# Patient Record
Sex: FEMALE | Race: WHITE | NOT HISPANIC OR LATINO | Employment: UNEMPLOYED | URBAN - METROPOLITAN AREA
[De-identification: names, ages, dates, MRNs, and addresses within clinical notes are randomized per-mention and may not be internally consistent; named-entity substitution may affect disease eponyms.]

---

## 2017-06-13 ENCOUNTER — OFFICE VISIT (OUTPATIENT)
Dept: OPHTHALMOLOGY | Facility: CLINIC | Age: 52
End: 2017-06-13
Payer: COMMERCIAL

## 2017-06-13 DIAGNOSIS — Z83.518 FAMILY HISTORY OF MACULAR DEGENERATION: ICD-10-CM

## 2017-06-13 DIAGNOSIS — H04.123 DRY EYE SYNDROME, BILATERAL: Primary | ICD-10-CM

## 2017-06-13 PROCEDURE — 92014 COMPRE OPH EXAM EST PT 1/>: CPT | Mod: S$GLB,,, | Performed by: OPHTHALMOLOGY

## 2017-06-13 PROCEDURE — 99999 PR PBB SHADOW E&M-EST. PATIENT-LVL II: CPT | Mod: PBBFAC,,, | Performed by: OPHTHALMOLOGY

## 2017-06-13 NOTE — PROGRESS NOTES
HPI     Dry Eye    Additional comments: 2 year RTC, Systane prn OU           Comments   Pt states she's been doing well since her last visit. Had to bump up to   +1.75 readers, was using +1.50. Can still see well at a distance. Only   using Systane as needed, not everyday. Wasn't aware of the [place] 3 Fish   Oil. Will give that a try. No ocular pain or irritation.    PCP: Dr. Lou    Dry Eyes  Presbyopia  Fm Hx Macular Degeneration (Mother)  Systane OU prn       Last edited by Will Valera on 6/13/2017  8:59 AM. (History)            Assessment /Plan     For exam results, see Encounter Report.      ICD-10-CM ICD-9-CM    1. Dry eye syndrome, bilateral H04.123 375.15 Continue systane and add omega 3 fish oil.    2. Family history of macular degeneration Z83.518 V19.19        Return to clinic 1 year.

## 2018-01-05 ENCOUNTER — OFFICE VISIT (OUTPATIENT)
Dept: OPHTHALMOLOGY | Facility: CLINIC | Age: 53
End: 2018-01-05
Payer: COMMERCIAL

## 2018-01-05 DIAGNOSIS — H10.13 ALLERGIC CONJUNCTIVITIS OF BOTH EYES: Primary | ICD-10-CM

## 2018-01-05 PROCEDURE — 99999 PR PBB SHADOW E&M-EST. PATIENT-LVL II: CPT | Mod: PBBFAC,,, | Performed by: OPHTHALMOLOGY

## 2018-01-05 PROCEDURE — 92012 INTRM OPH EXAM EST PATIENT: CPT | Mod: S$GLB,,, | Performed by: OPHTHALMOLOGY

## 2018-01-05 RX ORDER — FLUTICASONE PROPIONATE 50 MCG
SPRAY, SUSPENSION (ML) NASAL
Refills: 0 | COMMUNITY
Start: 2017-12-11 | End: 2023-08-29

## 2018-01-05 NOTE — PROGRESS NOTES
===============================  01/05/2018   Haley Younger,   52 y.o. female   Last visit [facility]: :Visit date not found   Last visit eye dept. Visit date not found  [facility]:  Uncorrected distance visual acuity was 20/20 in the right eye and 20/20 in the left eye.   Not recorded         Not recorded         Not recorded        Chief Complaint   Patient presents with    Dry Eye SYNDROME     NEW PT TO DR. VIERA/  [facility] AND RED EYES        HPI     Dry Eye SYNDROME    Additional comments: NEW PT TO DR. VIERA/  [facility] AND RED EYES           Comments   PCP: Dr. Lou    Dry Eyes  Presbyopia  Fm Hx Macular Degeneration (Mother)  Systane OU prn       Last edited by Maryam Albarran on 1/5/2018 11:26 AM. (History)          ________________  1/5/2018  Problem List Items Addressed This Visit     None        Since Monday blurred va red eye  Lid everfsion Ok  No stain 'allegrci    Then zaditor inb    k cl lens cl  T  18  18  inb zaditor         ===========================

## 2018-01-30 ENCOUNTER — OFFICE VISIT (OUTPATIENT)
Dept: OPHTHALMOLOGY | Facility: CLINIC | Age: 53
End: 2018-01-30
Payer: COMMERCIAL

## 2018-01-30 DIAGNOSIS — H52.03 HYPERMETROPIA OF BOTH EYES: Primary | ICD-10-CM

## 2018-01-30 PROCEDURE — 92014 COMPRE OPH EXAM EST PT 1/>: CPT | Mod: S$GLB,,, | Performed by: OPHTHALMOLOGY

## 2018-01-30 PROCEDURE — 99999 PR PBB SHADOW E&M-EST. PATIENT-LVL II: CPT | Mod: PBBFAC,,, | Performed by: OPHTHALMOLOGY

## 2018-01-30 NOTE — PROGRESS NOTES
===============================  01/30/2018   Haley Younger,   52 y.o. female   Last visit [facility]: :1/5/2018   Last visit eye dept. 1/5/2018  [facility]:  Uncorrected distance visual acuity was 20/20 in the right eye and 20/20 in the left eye.  Tonometry     Tonometry (Applanation, 8:31 AM)       Right Left    Pressure 18 16               Not recorded        Manifest Refraction     Manifest Refraction       Sphere Cylinder Dist VA    Right +1.00 Sphere 20/20    Left +1.00 Sphere 20/20              Chief Complaint   Patient presents with    Concerns About Ocular Health     pt c/o episodes of hazy vision and light sensitivity, seeing a neurologist next week for head pressure        HPI     Concerns About Ocular Health    Additional comments: pt c/o episodes of hazy vision and light   sensitivity, seeing a neurologist next week for head pressure           Comments   Dry Eyes  Presbyopia  Fm Hx Macular Degeneration (Mother)  Systane OU prn       Last edited by YEMI Brown on 1/30/2018  8:00 AM. (History)          ________________  1/30/2018  Problem List Items Addressed This Visit     None      Visit Diagnoses     Hypermetropia of both eyes    -  Primary      latent hyperope  Gl rx given  If symptoms dont inprove call           ===========================

## 2018-02-13 ENCOUNTER — OFFICE VISIT (OUTPATIENT)
Dept: OPHTHALMOLOGY | Facility: CLINIC | Age: 53
End: 2018-02-13
Payer: COMMERCIAL

## 2018-02-13 DIAGNOSIS — H52.03 HYPERMETROPIA OF BOTH EYES: Primary | ICD-10-CM

## 2018-02-13 PROCEDURE — 99499 UNLISTED E&M SERVICE: CPT | Mod: S$GLB,,, | Performed by: OPHTHALMOLOGY

## 2018-02-13 PROCEDURE — 99999 PR PBB SHADOW E&M-EST. PATIENT-LVL II: CPT | Mod: PBBFAC,,, | Performed by: OPHTHALMOLOGY

## 2018-02-13 NOTE — PROGRESS NOTES
"    ===============================  02/13/2018   Haley Younger,   52 y.o. female   Last visit [facility]: :1/30/2018   Last visit eye dept. 1/30/2018  [facility]:  Corrected distance visual acuity was 20/20 in the right eye and 20/25 in the left eye.   Not recorded        Wearing Rx     Wearing Rx       Sphere Add    Right +1.25 +2.00    Left +1.25 +2.00    Type:  PAL               Not recorded        Chief Complaint   Patient presents with    EPRX     everything is blurry at a distance with her new glasses        HPI     EPRX    Additional comments: everything is blurry at a distance with her new   glasses           Comments   PCP: Dr. Lou    Dry Eyes  Presbyopia  Fm Hx Macular Degeneration (Mother)  Systane OU prn       Last edited by Maryam Albarran on 2/13/2018 10:18 AM. (History)          ________________  2/13/2018  Problem List Items Addressed This Visit     None      Visit Diagnoses     Hypermetropia of both eyes    -  Primary        Pt doesn't like +125 at distance  rec trying +50 "wear them and see if you get used to them, it may take several days"  rtc 10 days         ===========================    "

## 2019-09-30 ENCOUNTER — TELEPHONE (OUTPATIENT)
Dept: OPHTHALMOLOGY | Facility: CLINIC | Age: 54
End: 2019-09-30

## 2020-01-03 ENCOUNTER — OFFICE VISIT (OUTPATIENT)
Dept: OPHTHALMOLOGY | Facility: CLINIC | Age: 55
End: 2020-01-03
Payer: COMMERCIAL

## 2020-01-03 DIAGNOSIS — H52.03 HYPEROPIA OF BOTH EYES: Primary | ICD-10-CM

## 2020-01-03 DIAGNOSIS — E78.5 HYPERLIPIDEMIA, UNSPECIFIED HYPERLIPIDEMIA TYPE: ICD-10-CM

## 2020-01-03 PROBLEM — F39 MOOD DISORDER IN PARTIAL REMISSION: Status: ACTIVE | Noted: 2017-10-03

## 2020-01-03 PROBLEM — E55.9 VITAMIN D DEFICIENCY: Status: ACTIVE | Noted: 2018-09-24

## 2020-01-03 PROBLEM — H93.13 TINNITUS OF BOTH EARS: Status: ACTIVE | Noted: 2020-01-03

## 2020-01-03 PROCEDURE — 99999 PR PBB SHADOW E&M-EST. PATIENT-LVL II: ICD-10-PCS | Mod: PBBFAC,,, | Performed by: OPHTHALMOLOGY

## 2020-01-03 PROCEDURE — 99999 PR PBB SHADOW E&M-EST. PATIENT-LVL II: CPT | Mod: PBBFAC,,, | Performed by: OPHTHALMOLOGY

## 2020-01-03 PROCEDURE — 92014 PR EYE EXAM, EST PATIENT,COMPREHESV: ICD-10-PCS | Mod: S$GLB,,, | Performed by: OPHTHALMOLOGY

## 2020-01-03 PROCEDURE — 92014 COMPRE OPH EXAM EST PT 1/>: CPT | Mod: S$GLB,,, | Performed by: OPHTHALMOLOGY

## 2020-01-03 NOTE — PROGRESS NOTES
===============================  Haley RIP Younger,  1/3/2020 today   54 y.o. female   Last visit [facility]: :2/13/2018   Last visit eye dept. Visit date not found  [facility]:  Uncorrected distance visual acuity was 20/20 in the right eye and 20/20 in the left eye.  Tonometry     Tonometry (Applanation, 9:30 AM)       Right Left    Pressure 15 14               Not recorded         Not recorded         Not recorded        Chief Complaint   Patient presents with    Annual Exam     pt states she has no complaints, just wants eyes checked, doesnt need glasses for dist       ________________  1/3/2020 today  HPI     Annual Exam      Additional comments: pt states she has no complaints, just wants eyes   checked, doesnt need glasses for dist              Comments     Dry Eyes  Presbyopia  Fm Hx Macular Degeneration (Mother)  Systane OU prn          Last edited by YEMI Brown on 1/3/2020  9:25 AM. (History)      Problem List Items Addressed This Visit        Eye/Vision problems    Hyperopia of both eyes - Primary       Other    Hyperlipidemia          .normal eye exam  rtc 1 year  ===========================

## 2021-02-05 ENCOUNTER — OFFICE VISIT (OUTPATIENT)
Dept: OPHTHALMOLOGY | Facility: CLINIC | Age: 56
End: 2021-02-05
Payer: COMMERCIAL

## 2021-02-05 DIAGNOSIS — E78.5 HYPERLIPIDEMIA, UNSPECIFIED HYPERLIPIDEMIA TYPE: ICD-10-CM

## 2021-02-05 DIAGNOSIS — Z83.518 FAMILY HISTORY OF MACULAR DEGENERATION: Primary | ICD-10-CM

## 2021-02-05 DIAGNOSIS — H52.03: ICD-10-CM

## 2021-02-05 DIAGNOSIS — H04.123 DRY EYES, BILATERAL: ICD-10-CM

## 2021-02-05 PROCEDURE — 92014 PR EYE EXAM, EST PATIENT,COMPREHESV: ICD-10-PCS | Mod: S$GLB,,, | Performed by: OPHTHALMOLOGY

## 2021-02-05 PROCEDURE — 1126F PR PAIN SEVERITY QUANTIFIED, NO PAIN PRESENT: ICD-10-PCS | Mod: S$GLB,,, | Performed by: OPHTHALMOLOGY

## 2021-02-05 PROCEDURE — 99999 PR PBB SHADOW E&M-EST. PATIENT-LVL III: CPT | Mod: PBBFAC,,, | Performed by: OPHTHALMOLOGY

## 2021-02-05 PROCEDURE — 92134 POSTERIOR SEGMENT OCT RETINA (OCULAR COHERENCE TOMOGRAPHY)-BOTH EYES: ICD-10-PCS | Mod: S$GLB,,, | Performed by: OPHTHALMOLOGY

## 2021-02-05 PROCEDURE — 99999 PR PBB SHADOW E&M-EST. PATIENT-LVL III: ICD-10-PCS | Mod: PBBFAC,,, | Performed by: OPHTHALMOLOGY

## 2021-02-05 PROCEDURE — 92134 CPTRZ OPH DX IMG PST SGM RTA: CPT | Mod: S$GLB,,, | Performed by: OPHTHALMOLOGY

## 2021-02-05 PROCEDURE — 92014 COMPRE OPH EXAM EST PT 1/>: CPT | Mod: S$GLB,,, | Performed by: OPHTHALMOLOGY

## 2021-02-05 PROCEDURE — 1126F AMNT PAIN NOTED NONE PRSNT: CPT | Mod: S$GLB,,, | Performed by: OPHTHALMOLOGY

## 2021-02-05 RX ORDER — CETIRIZINE HYDROCHLORIDE 10 MG/1
CAPSULE, LIQUID FILLED ORAL
COMMUNITY

## 2021-02-05 RX ORDER — PANTOPRAZOLE SODIUM 40 MG/1
40 TABLET, DELAYED RELEASE ORAL DAILY
COMMUNITY
Start: 2021-01-20

## 2022-03-25 ENCOUNTER — OFFICE VISIT (OUTPATIENT)
Dept: OPHTHALMOLOGY | Facility: CLINIC | Age: 57
End: 2022-03-25
Payer: COMMERCIAL

## 2022-03-25 DIAGNOSIS — H52.03: ICD-10-CM

## 2022-03-25 DIAGNOSIS — Z83.518 FAMILY HISTORY OF MACULAR DEGENERATION: Primary | ICD-10-CM

## 2022-03-25 DIAGNOSIS — E78.5 HYPERLIPIDEMIA, UNSPECIFIED HYPERLIPIDEMIA TYPE: ICD-10-CM

## 2022-03-25 DIAGNOSIS — H04.123 DRY EYES, BILATERAL: ICD-10-CM

## 2022-03-25 PROCEDURE — 1160F RVW MEDS BY RX/DR IN RCRD: CPT | Mod: CPTII,S$GLB,, | Performed by: OPHTHALMOLOGY

## 2022-03-25 PROCEDURE — 1159F MED LIST DOCD IN RCRD: CPT | Mod: CPTII,S$GLB,, | Performed by: OPHTHALMOLOGY

## 2022-03-25 PROCEDURE — 92134 CPTRZ OPH DX IMG PST SGM RTA: CPT | Mod: S$GLB,,, | Performed by: OPHTHALMOLOGY

## 2022-03-25 PROCEDURE — 2023F PR DILATED RETINAL EXAM W/O EVID OF RETINOPATHY: ICD-10-PCS | Mod: CPTII,S$GLB,, | Performed by: OPHTHALMOLOGY

## 2022-03-25 PROCEDURE — 99999 PR PBB SHADOW E&M-EST. PATIENT-LVL II: ICD-10-PCS | Mod: PBBFAC,,, | Performed by: OPHTHALMOLOGY

## 2022-03-25 PROCEDURE — 99213 PR OFFICE/OUTPT VISIT, EST, LEVL III, 20-29 MIN: ICD-10-PCS | Mod: S$GLB,,, | Performed by: OPHTHALMOLOGY

## 2022-03-25 PROCEDURE — 2023F DILAT RTA XM W/O RTNOPTHY: CPT | Mod: CPTII,S$GLB,, | Performed by: OPHTHALMOLOGY

## 2022-03-25 PROCEDURE — 99999 PR PBB SHADOW E&M-EST. PATIENT-LVL II: CPT | Mod: PBBFAC,,, | Performed by: OPHTHALMOLOGY

## 2022-03-25 PROCEDURE — 92134 POSTERIOR SEGMENT OCT RETINA (OCULAR COHERENCE TOMOGRAPHY)-BOTH EYES: ICD-10-PCS | Mod: S$GLB,,, | Performed by: OPHTHALMOLOGY

## 2022-03-25 PROCEDURE — 99213 OFFICE O/P EST LOW 20 MIN: CPT | Mod: S$GLB,,, | Performed by: OPHTHALMOLOGY

## 2022-03-25 PROCEDURE — 1159F PR MEDICATION LIST DOCUMENTED IN MEDICAL RECORD: ICD-10-PCS | Mod: CPTII,S$GLB,, | Performed by: OPHTHALMOLOGY

## 2022-03-25 PROCEDURE — 1160F PR REVIEW ALL MEDS BY PRESCRIBER/CLIN PHARMACIST DOCUMENTED: ICD-10-PCS | Mod: CPTII,S$GLB,, | Performed by: OPHTHALMOLOGY

## 2022-03-29 ENCOUNTER — OFFICE VISIT (OUTPATIENT)
Dept: OPHTHALMOLOGY | Facility: CLINIC | Age: 57
End: 2022-03-29
Payer: COMMERCIAL

## 2022-03-29 DIAGNOSIS — H52.03 HYPEROPIA OF BOTH EYES: Primary | ICD-10-CM

## 2022-03-29 DIAGNOSIS — Z83.518 FAMILY HISTORY OF MACULAR DEGENERATION: ICD-10-CM

## 2022-03-29 PROCEDURE — 99999 PR PBB SHADOW E&M-EST. PATIENT-LVL II: ICD-10-PCS | Mod: PBBFAC,,, | Performed by: OPTOMETRIST

## 2022-03-29 PROCEDURE — 92002 PR EYE EXAM, NEW PATIENT,INTERMED: ICD-10-PCS | Mod: S$GLB,,, | Performed by: OPTOMETRIST

## 2022-03-29 PROCEDURE — 1159F PR MEDICATION LIST DOCUMENTED IN MEDICAL RECORD: ICD-10-PCS | Mod: CPTII,S$GLB,, | Performed by: OPTOMETRIST

## 2022-03-29 PROCEDURE — 99999 PR PBB SHADOW E&M-EST. PATIENT-LVL II: CPT | Mod: PBBFAC,,, | Performed by: OPTOMETRIST

## 2022-03-29 PROCEDURE — 92002 INTRM OPH EXAM NEW PATIENT: CPT | Mod: S$GLB,,, | Performed by: OPTOMETRIST

## 2022-03-29 PROCEDURE — 1159F MED LIST DOCD IN RCRD: CPT | Mod: CPTII,S$GLB,, | Performed by: OPTOMETRIST

## 2022-03-29 RX ORDER — ONDANSETRON 4 MG/1
4 TABLET, FILM COATED ORAL
COMMUNITY
End: 2023-08-29

## 2022-03-29 RX ORDER — MELOXICAM 15 MG
15 TABLET ORAL DAILY
COMMUNITY
Start: 2021-10-08 | End: 2022-11-08

## 2022-03-29 RX ORDER — HYDROXYZINE PAMOATE 50 MG/1
50 CAPSULE ORAL
COMMUNITY
End: 2022-11-08

## 2022-03-29 NOTE — PROGRESS NOTES
HPI     New patient to [facility]   Patient here for CTL fit    Pt states Dr. Jack told her to come in for CTL lens fitting and dry   refraction.     Last edited by Erika Zavala MA on 3/29/2022  1:41 PM. (History)            Assessment /Plan     For exam results, see Encounter Report.    Hyperopia of both eyes  Risks/benefits of contact lens discussed. Patient deferred CTL fitting for now. MRx provided. Discussed multifocal contact lens. Consider Vuity eye drops if patient elects to remain out of contact lenses.   Eyeglass Final Rx     Eyeglass Final Rx       Sphere Cylinder Axis Add    Right +1.50   +2.50    Left +1.00 +0.50 099 +2.50    Type: PAL                Family history of macular degeneration  Sees Dr Jack, continue per Dr Jack.     RTC with Dr Jack as scheduled for annual examinations. Sooner if any changes to vision or worsening symptoms.

## 2023-03-21 NOTE — PROGRESS NOTES
===============================  Date today is 3/28/2023  Haley Younger is a 57 y.o. female  Last visit [facility]: :3/25/2022   Last visit eye dept. Visit date not found    Corrected distance visual acuity was 20/20 in the right eye and 20/25 in the left eye.  Tonometry       Tonometry (Applanation, 10:05 AM)         Right Left    Pressure 14 14                  Wearing Rx       Wearing Rx         Sphere Add    Right +0.50 +2.50    Left +0.50 +2.50                  Manifest Refraction       Manifest Refraction         Sphere Dist VA    Right +1.25 20/20    Left +1.25 20/20                  Not recorded       Chief Complaint   Patient presents with    Family history of macular degeneration     Yearly exam       Problem List Items Addressed This Visit    None  Visit Diagnoses       Family history of macular degeneration    -  Primary          Instructed to call 24/7 for any worsening of vision, visual distortion or pain.  Check OU independently daily.    Gave my office and personal cell phone number.  ________________  3/28/2023 today  Haley Younger      Fhx AMD  OCT stable  Dry eyes  Presbyopia  Update glasses today with +2.00 add, can adjust if needed  Clear lens OU  Macula looks good OU  No signs of macular degeneration    RTC 1 year  Instructed to call 24/7 for any worsening of vision or symptoms. Check OU daily.   Gave my office and cell phone number.      =============================

## 2023-03-28 ENCOUNTER — OFFICE VISIT (OUTPATIENT)
Dept: OPHTHALMOLOGY | Facility: CLINIC | Age: 58
End: 2023-03-28
Payer: COMMERCIAL

## 2023-03-28 DIAGNOSIS — Z83.518 FAMILY HISTORY OF MACULAR DEGENERATION: Primary | ICD-10-CM

## 2023-03-28 PROCEDURE — 1160F RVW MEDS BY RX/DR IN RCRD: CPT | Mod: CPTII,S$GLB,, | Performed by: OPHTHALMOLOGY

## 2023-03-28 PROCEDURE — 1160F PR REVIEW ALL MEDS BY PRESCRIBER/CLIN PHARMACIST DOCUMENTED: ICD-10-PCS | Mod: CPTII,S$GLB,, | Performed by: OPHTHALMOLOGY

## 2023-03-28 PROCEDURE — 92014 PR EYE EXAM, EST PATIENT,COMPREHESV: ICD-10-PCS | Mod: S$GLB,,, | Performed by: OPHTHALMOLOGY

## 2023-03-28 PROCEDURE — 99999 PR PBB SHADOW E&M-EST. PATIENT-LVL III: CPT | Mod: PBBFAC,,, | Performed by: OPHTHALMOLOGY

## 2023-03-28 PROCEDURE — 99999 PR PBB SHADOW E&M-EST. PATIENT-LVL III: ICD-10-PCS | Mod: PBBFAC,,, | Performed by: OPHTHALMOLOGY

## 2023-03-28 PROCEDURE — 1159F MED LIST DOCD IN RCRD: CPT | Mod: CPTII,S$GLB,, | Performed by: OPHTHALMOLOGY

## 2023-03-28 PROCEDURE — 92014 COMPRE OPH EXAM EST PT 1/>: CPT | Mod: S$GLB,,, | Performed by: OPHTHALMOLOGY

## 2023-03-28 PROCEDURE — 1159F PR MEDICATION LIST DOCUMENTED IN MEDICAL RECORD: ICD-10-PCS | Mod: CPTII,S$GLB,, | Performed by: OPHTHALMOLOGY

## 2023-03-28 RX ORDER — IBUPROFEN 100 MG/5ML
1 SUSPENSION, ORAL (FINAL DOSE FORM) ORAL EVERY MORNING
COMMUNITY

## 2024-03-12 ENCOUNTER — OFFICE VISIT (OUTPATIENT)
Dept: OPHTHALMOLOGY | Facility: CLINIC | Age: 59
End: 2024-03-12
Payer: COMMERCIAL

## 2024-03-12 DIAGNOSIS — Z83.518 FAMILY HISTORY OF MACULAR DEGENERATION: Primary | ICD-10-CM

## 2024-03-12 DIAGNOSIS — H04.123 DRY EYES, BILATERAL: ICD-10-CM

## 2024-03-12 DIAGNOSIS — H52.03 HYPEROPIA OF BOTH EYES: ICD-10-CM

## 2024-03-12 DIAGNOSIS — H52.03: ICD-10-CM

## 2024-03-12 PROCEDURE — 99999 PR PBB SHADOW E&M-EST. PATIENT-LVL II: CPT | Mod: PBBFAC,,, | Performed by: OPHTHALMOLOGY

## 2024-03-12 PROCEDURE — 1160F RVW MEDS BY RX/DR IN RCRD: CPT | Mod: CPTII,S$GLB,, | Performed by: OPHTHALMOLOGY

## 2024-03-12 PROCEDURE — 1159F MED LIST DOCD IN RCRD: CPT | Mod: CPTII,S$GLB,, | Performed by: OPHTHALMOLOGY

## 2024-03-12 PROCEDURE — 99214 OFFICE O/P EST MOD 30 MIN: CPT | Mod: S$GLB,,, | Performed by: OPHTHALMOLOGY

## 2024-03-12 NOTE — PROGRESS NOTES
===============================  Date today is 3/12/2024  Haley Younger is a 58 y.o. female  Last visit [facility]: :3/28/2023   Last visit eye dept. Visit date not found    Corrected distance visual acuity was 20/20 in the right eye and 20/20 in the left eye.  Tonometry       Tonometry (Applanation, 8:39 AM)         Right Left    Pressure 11 11                  Wearing Rx       Wearing Rx         Sphere Cylinder Add    Right +1.25 Sphere +2.00    Left +1.25 Sphere +2.00                  Not recorded       Not recorded       Chief Complaint   Patient presents with    family history of macular degeneration     Yearly exam     HPI     family history of macular degeneration     Additional comments: Yearly exam           Comments    PCP: Dr. Lou    Dry Eyes  Presbyopia  Fm Hx Macular Degeneration (Mother)  Systane OU prn            Last edited by Maryam Albarran on 3/12/2024  8:30 AM.      Problem List Items Addressed This Visit          Eye/Vision problems    Hyperopia of both eyes     Other Visit Diagnoses       Family history of macular degeneration    -  Primary    Manifest hyperopia of both eyes        Dry eyes, bilateral              Instructed to call 24/7 for any worsening of vision, visual distortion or pain.  Check OU independently daily.    Gave my office and personal cell phone number.  ________________  3/12/2024 today  Haley Younger    Fhx AMD  OCT looks great  Anterior chamber looks good  Macula looks good  No sign of macular degeneration  No diabetes  No glaucoma  Hyperopia    RTC 1 year  Instructed to call 24/7 for any worsening of vision or symptoms. Check OU daily.   Gave my office and cell phone number.    =============================

## 2024-12-12 ENCOUNTER — OFFICE VISIT (OUTPATIENT)
Dept: OPHTHALMOLOGY | Facility: CLINIC | Age: 59
End: 2024-12-12
Payer: COMMERCIAL

## 2024-12-12 DIAGNOSIS — H52.4 HYPEROPIA WITH PRESBYOPIA OF BOTH EYES: ICD-10-CM

## 2024-12-12 DIAGNOSIS — G51.4 EYELID MYOKYMIA: ICD-10-CM

## 2024-12-12 DIAGNOSIS — H04.123 DRY EYES, BILATERAL: Primary | ICD-10-CM

## 2024-12-12 DIAGNOSIS — H52.03 HYPEROPIA OF BOTH EYES: ICD-10-CM

## 2024-12-12 DIAGNOSIS — H52.03 HYPEROPIA WITH PRESBYOPIA OF BOTH EYES: ICD-10-CM

## 2024-12-12 PROCEDURE — 1159F MED LIST DOCD IN RCRD: CPT | Mod: CPTII,S$GLB,, | Performed by: OPTOMETRIST

## 2024-12-12 PROCEDURE — 92015 DETERMINE REFRACTIVE STATE: CPT | Mod: S$GLB,,, | Performed by: OPTOMETRIST

## 2024-12-12 PROCEDURE — 99214 OFFICE O/P EST MOD 30 MIN: CPT | Mod: S$GLB,,, | Performed by: OPTOMETRIST

## 2024-12-12 PROCEDURE — 3044F HG A1C LEVEL LT 7.0%: CPT | Mod: CPTII,S$GLB,, | Performed by: OPTOMETRIST

## 2024-12-12 PROCEDURE — 1160F RVW MEDS BY RX/DR IN RCRD: CPT | Mod: CPTII,S$GLB,, | Performed by: OPTOMETRIST

## 2024-12-12 PROCEDURE — 99999 PR PBB SHADOW E&M-EST. PATIENT-LVL III: CPT | Mod: PBBFAC,,, | Performed by: OPTOMETRIST

## 2025-03-25 ENCOUNTER — OFFICE VISIT (OUTPATIENT)
Dept: OPHTHALMOLOGY | Facility: CLINIC | Age: 60
End: 2025-03-25
Payer: COMMERCIAL

## 2025-03-25 DIAGNOSIS — Z83.518 FAMILY HISTORY OF MACULAR DEGENERATION: Primary | ICD-10-CM

## 2025-03-25 DIAGNOSIS — H52.03: ICD-10-CM

## 2025-03-25 DIAGNOSIS — H04.123 DRY EYES, BILATERAL: ICD-10-CM

## 2025-03-25 DIAGNOSIS — G51.4 EYELID MYOKYMIA: ICD-10-CM

## 2025-03-25 PROCEDURE — 1159F MED LIST DOCD IN RCRD: CPT | Mod: CPTII,S$GLB,, | Performed by: OPHTHALMOLOGY

## 2025-03-25 PROCEDURE — 99999 PR PBB SHADOW E&M-EST. PATIENT-LVL II: CPT | Mod: PBBFAC,,, | Performed by: OPHTHALMOLOGY

## 2025-03-25 PROCEDURE — 99214 OFFICE O/P EST MOD 30 MIN: CPT | Mod: S$GLB,,, | Performed by: OPHTHALMOLOGY

## 2025-03-25 PROCEDURE — 1160F RVW MEDS BY RX/DR IN RCRD: CPT | Mod: CPTII,S$GLB,, | Performed by: OPHTHALMOLOGY

## 2025-03-25 NOTE — PROGRESS NOTES
===============================  Date today is 3/25/2025  Haley Younger is a 59 y.o. female  Last visit [facility]: :3/12/2024   Last visit eye dept. Visit date not found    Corrected distance visual acuity was 20/20 -2 in the right eye and 20/25 in the left eye.  Tonometry       Tonometry ([facility]care, 10:41 AM)         Right Left    Pressure 11 16                  Not recorded       Not recorded       Not recorded       Chief Complaint   Patient presents with    Annual Exam      Fhx AMD yearly exam     HPI     Annual Exam            Comments:  Fhx AMD yearly exam          Comments    PCP: Dr. Lou    Dry Eyes  Presbyopia  Fm Hx Macular Degeneration (Mother)  Systane OU prn             Last edited by Kimmy Brewster on 3/25/2025 10:31 AM.      Problem List Items Addressed This Visit    None  Visit Diagnoses         Family history of macular degeneration    -  Primary      Manifest hyperopia of both eyes          Dry eyes, bilateral          Eyelid myokymia              Instructed to call 24/7 for any worsening of vision, visual distortion or pain.  Check OU independently daily.    Gave my office and personal cell phone number.  ________________  3/25/2025 today  Haley Younger    Manifest hyperopia  Fhx macula degeneration  OCT stable  Trace NS OU  Dry eyes  Sharp disc OU  Macula looks good  MR=NI    RTC 1 year  Instructed to call 24/7 for any worsening of vision or symptoms. Check OU daily.   Gave my office and cell phone number.    =============================